# Patient Record
Sex: MALE | URBAN - METROPOLITAN AREA
[De-identification: names, ages, dates, MRNs, and addresses within clinical notes are randomized per-mention and may not be internally consistent; named-entity substitution may affect disease eponyms.]

---

## 2017-03-22 ENCOUNTER — OFFICE VISIT - RIVER FALLS (OUTPATIENT)
Dept: FAMILY MEDICINE | Facility: CLINIC | Age: 22
End: 2017-03-22

## 2017-03-22 ASSESSMENT — MIFFLIN-ST. JEOR: SCORE: 1914.33

## 2017-06-19 ENCOUNTER — OFFICE VISIT - RIVER FALLS (OUTPATIENT)
Dept: FAMILY MEDICINE | Facility: CLINIC | Age: 22
End: 2017-06-19

## 2017-06-19 ASSESSMENT — MIFFLIN-ST. JEOR: SCORE: 1917.96

## 2017-09-18 ENCOUNTER — OFFICE VISIT - RIVER FALLS (OUTPATIENT)
Dept: FAMILY MEDICINE | Facility: CLINIC | Age: 22
End: 2017-09-18

## 2017-09-18 ASSESSMENT — MIFFLIN-ST. JEOR: SCORE: 1929.75

## 2017-12-06 ENCOUNTER — OFFICE VISIT - RIVER FALLS (OUTPATIENT)
Dept: FAMILY MEDICINE | Facility: CLINIC | Age: 22
End: 2017-12-06

## 2017-12-06 ASSESSMENT — MIFFLIN-ST. JEOR: SCORE: 1966.94

## 2018-02-07 ENCOUNTER — OFFICE VISIT - RIVER FALLS (OUTPATIENT)
Dept: FAMILY MEDICINE | Facility: CLINIC | Age: 23
End: 2018-02-07

## 2018-02-07 ASSESSMENT — MIFFLIN-ST. JEOR: SCORE: 1998.7

## 2018-02-09 ENCOUNTER — OFFICE VISIT - RIVER FALLS (OUTPATIENT)
Dept: FAMILY MEDICINE | Facility: CLINIC | Age: 23
End: 2018-02-09

## 2018-02-09 ASSESSMENT — MIFFLIN-ST. JEOR: SCORE: 1998.7

## 2018-04-30 ENCOUNTER — OFFICE VISIT - RIVER FALLS (OUTPATIENT)
Dept: FAMILY MEDICINE | Facility: CLINIC | Age: 23
End: 2018-04-30

## 2018-04-30 ASSESSMENT — MIFFLIN-ST. JEOR: SCORE: 2001.42

## 2018-07-16 ENCOUNTER — OFFICE VISIT - RIVER FALLS (OUTPATIENT)
Dept: FAMILY MEDICINE | Facility: CLINIC | Age: 23
End: 2018-07-16

## 2018-07-16 ASSESSMENT — MIFFLIN-ST. JEOR: SCORE: 2034.98

## 2022-02-12 VITALS
TEMPERATURE: 97.3 F | HEIGHT: 74 IN | DIASTOLIC BLOOD PRESSURE: 82 MMHG | OXYGEN SATURATION: 98 % | HEART RATE: 65 BPM | WEIGHT: 215 LBS | BODY MASS INDEX: 27.59 KG/M2 | SYSTOLIC BLOOD PRESSURE: 134 MMHG

## 2022-02-12 VITALS
HEART RATE: 71 BPM | WEIGHT: 189.2 LBS | HEIGHT: 74 IN | TEMPERATURE: 98.7 F | SYSTOLIC BLOOD PRESSURE: 136 MMHG | DIASTOLIC BLOOD PRESSURE: 71 MMHG | BODY MASS INDEX: 24.28 KG/M2

## 2022-02-12 VITALS
BODY MASS INDEX: 24.62 KG/M2 | HEART RATE: 64 BPM | TEMPERATURE: 98.7 F | WEIGHT: 191.8 LBS | SYSTOLIC BLOOD PRESSURE: 126 MMHG | DIASTOLIC BLOOD PRESSURE: 66 MMHG | HEIGHT: 74 IN

## 2022-02-12 VITALS
DIASTOLIC BLOOD PRESSURE: 64 MMHG | BODY MASS INDEX: 26.56 KG/M2 | SYSTOLIC BLOOD PRESSURE: 126 MMHG | HEART RATE: 88 BPM | TEMPERATURE: 97.9 F | WEIGHT: 200 LBS | SYSTOLIC BLOOD PRESSURE: 124 MMHG | DIASTOLIC BLOOD PRESSURE: 74 MMHG | RESPIRATION RATE: 16 BRPM | TEMPERATURE: 98.8 F | SYSTOLIC BLOOD PRESSURE: 136 MMHG | HEIGHT: 74 IN | BODY MASS INDEX: 25.67 KG/M2 | HEART RATE: 60 BPM | DIASTOLIC BLOOD PRESSURE: 76 MMHG | HEART RATE: 66 BPM | WEIGHT: 207 LBS | BODY MASS INDEX: 26.56 KG/M2 | HEIGHT: 74 IN | TEMPERATURE: 98.5 F | WEIGHT: 207 LBS | HEIGHT: 74 IN

## 2022-02-12 VITALS
HEART RATE: 89 BPM | SYSTOLIC BLOOD PRESSURE: 125 MMHG | HEIGHT: 74 IN | DIASTOLIC BLOOD PRESSURE: 70 MMHG | BODY MASS INDEX: 24.18 KG/M2 | WEIGHT: 188.4 LBS | TEMPERATURE: 98.3 F

## 2022-02-12 VITALS
BODY MASS INDEX: 26.64 KG/M2 | HEIGHT: 74 IN | DIASTOLIC BLOOD PRESSURE: 98 MMHG | SYSTOLIC BLOOD PRESSURE: 134 MMHG | HEART RATE: 60 BPM | WEIGHT: 207.6 LBS | TEMPERATURE: 98 F

## 2022-02-15 NOTE — PROGRESS NOTES
Patient:   JOVANNA LAW            MRN: [id number]            FIN: [id number]               Age:   22 years     Sex:  Male     :  1995   Associated Diagnoses:   ADHD (attention deficit hyperactivity disorder); Depression with anxiety; Heart murmur   Author:   Malik Feliciano MD      Visit Information      Date of Service: 2017 04:39 pm  Performing Location: [facility]  Encounter#: [id number]      Primary Care Provider (PCP):  Malik Feliciano MD    NPI# [id number]      Referring Provider:  Malik Feliciano MD    NPI# [id number]   Started on ADHD medication in 2014.     Diagnosed with ADHD Davidjadonnorma Burrell. Had been getting bad grades starting in grade school thru middle school and high school for the first two years. Felt like going nowhere. Did not understand reason for not being able to focus. Had a hard time forming relationships with family and friends. Prior to medication mom states he had a hard time concentrating. He would sit in his room alone and thought he was depressed. Parents would ground him from bad grades and then would stay in his room. Grades slowly improved to A/B's in second semester on Adderall. He is more talkative around the house. He is making more friends. Making healthier choices with not drinking pop. Noticed the first day more calm on medication. Able to listen to the teacher. Has not felt high from it.      Chief Complaint   2017 4:41 PM CDT    Med refills, does not have health insurance until Oct 1.        History of Present Illness     Will be getting insurance with parents on [employer]. Last dose yesterday.  The extended release lasts until about 5pm. The immediate release helps the rest of the day. The 20mg XR did not last as long as the 25mg XR.  Mom manages the medication and keeps it locked up.  Zoloft continue to give him more motivation and energy. Sleeping better and not feeling down all the time. Has less anxiety and able to talk  with people easier. Rarely has racing thoughts. Depression started around 6th grade when moved from [place] to [place]. Depression intermittent in high school and then increased with girlfriend breakup. Exercise and lifting weights help. Seeing Dr Allen Burrell for counseling weekly    Needs sleep every night. Has more energy since no longer working late shift at [employer].  Worked at [employer] since May 2016 but quit for school.  Denies side effects from medication  Started [employer]. Working parttime at [employer]      Review of Systems   Constitutional:  sleeping good, good appetite.    Respiratory:  No shortness of breath.    Cardiovascular:  No chest pain.    ALEC-7: 0pts (September 2017). 1pt (March 2017). 9 pts (August 2015)  MDQ:  8 pts minor problem(August 2015)  PHQ-9: 0pts (September 2017). 0pts (March 2017). 2pts (April 2016)  CAGE: 0pts and quit alcohol      Health Status   Allergies:    Allergic Reactions (Selected)  No known allergies   Medications:  (Selected)   Prescriptions  Prescribed  Adderall 10 mg oral tablet: 1 tab(s) ( 10 mg ), po, daily, # 30 tab(s), 0 Refill(s), Type: Maintenance, Pharmacy: [id number], 1 tab(s) po daily  Adderall XR 25 mg oral capsule, extended release: 1 cap(s) ( 25 mg ), po, qam, # 30 cap(s), 0 Refill(s), Type: Maintenance, Pharmacy: [id number], 1 cap(s) po qam  Zoloft 50 mg oral tablet: 1 tab(s) ( 50 mg ), PO, Daily, # 95 tab(s), 3 Refill(s), Type: Maintenance, Pharmacy: [id number], 1 tab(s) po daily   Problem list:    All Problems  ADHD (attention deficit hyperactivity disorder) / SNOMED CT 01939006 / Confirmed  Depression with anxiety / SNOMED CT 558699565 / Confirmed  Resolved: History of chicken pox / SNOMED CT U672SPU1-2852--[id number]      Histories   Family History: parents and sister healthy. Thinks mom is bipolar   Procedure history:    Pilonidal  cyst and sinus with abscess (ICD-10-CM L05.0) performed by Arnie Montoya MD on 3/25/2016 at 21 Years.   Social History: [employer] and lives in [place]. Born in [place]. Parents have been here for at least 19 years.      Physical Examination   Vital Signs   9/18/2017 4:41 PM CDT Temperature Tympanic 98.7 DegF    Peripheral Pulse Rate 64 bpm    Systolic Blood Pressure 126 mmHg    Diastolic Blood Pressure 66 mmHg      General:  Alert and oriented, No acute distress.    Respiratory:  Lungs are clear to auscultation.    Cardiovascular:  Normal rate, Regular rhythm.    Musculoskeletal:  Normal gait.       Review / Management   [place] form on medication: inattentive - 0pts; hyperactive - 2pts; oppositional - 2pts (argues with adults and loses temper); conduct - 0pts; anxiety/depression - 0pts  Urine drug screen: appropriate June 2017, July 2016 and February 2016.  Reviewed [place] Drug [place] Monitoring [place] (September 2017). Cough medicine Dr Aden July 2017  DUI: May 2015  Echocardiogram March 2017: normal 55-60%      Impression and Plan   Diagnosis     ADHD (attention deficit hyperactivity disorder) (ALE95-YA F90.0).     Depression with anxiety (LNI45-GG F41.8).     Heart murmur (XHX57-FD R01.1).       ADHD: continue Adderall XR to 25mg in the morning and continue 10mg adderal immediate release in the afternoon. Follow up in 3 months. (Discussed strattera in past but not covered). Mom keeps medication locked up.  Depression with anxiety: much improved and continue zoloft (renewed March 2017)  Alcohol: Not drinking alcohol  Heart murmur: normal echocardiogram  Weight: 169 lbs in 2015. Still normal BMI  Immunizations: discussed HPV

## 2022-02-15 NOTE — PROGRESS NOTES
Patient:   JOVANNA LAW            MRN: [id number]            FIN: [id number]               Age:   22 years     Sex:  Male     :  1995   Associated Diagnoses:   ADHD (attention deficit hyperactivity disorder); Depression with anxiety; Heart murmur   Author:   Malik Feliciano MD      Visit Information      Date of Service: 2017 08:11 am  Performing Location: [facility]  Encounter#: [id number]      Primary Care Provider (PCP):  Malik Feliciano MD    NPI# [id number]      Referring Provider:  Malik Feliciano MD    NPI# [id number]   Started on ADHD medication in 2014.     Diagnosed with ADHD Allen Burrell. Had been getting bad grades starting in grade school thru middle school and high school for the first two years. Felt like going nowhere. Did not understand reason for not being able to focus. Had a hard time forming relationships with family and friends. Prior to medication mom states he had a hard time concentrating. He would sit in his room alone and thought he was depressed. Parents would ground him from bad grades and then would stay in his room. Grades slowly improved to A/B's in second semester on Adderall. He is more talkative around the house. He is making more friends. Making healthier choices with not drinking pop. Noticed the first day more calm on medication. Able to listen to the teacher. Has not felt high from it.      Chief Complaint   2017 8:21 AM CDT    ADHD check, seeing counselor  for depression      History of Present Illness     Back on insurance through work. Ran out adderall on Friday.  The extended release lasts until about 5pm. The immediate release helps the rest of the day. The 20mg XR did not last as long as the 25mg XR.  Mom manages the medication and keeps it locked up.  Zoloft continue to give him more motivation and energy. Sleeping better and not feeling down all the time. Has less anxiety and able to talk with people easier. Rarely has  racing thoughts. Depression started around 6th grade when moved from [place] to [place]. Depression intermittent in high school and then increased with girlfriend breakup. Exercise and lifting weights help. Seeing Dr Allen Burrell for counseling weekly    Needs sleep every night.  Working at [employer] since May 2016.  Denies side effects from medication         Review of Systems   Constitutional:  sleeping good, good appetite.    Respiratory:  No shortness of breath.    Cardiovascular:  No chest pain.    ALEC-7: 1pt (March 2017). 9 pts (August 2015)  MDQ:  8 pts minor problem(August 2015)  PHQ-9: 0pts (March 2017). 2pts (April 2016)  CAGE: 0pts and quit alcohol      Health Status   Allergies:    Allergic Reactions (Selected)  No known allergies   Medications:  (Selected)   Prescriptions  Prescribed  Adderall 10 mg oral tablet: 1 tab(s) ( 10 mg ), po, daily, # 30 tab(s), 0 Refill(s), Type: Maintenance, Pharmacy: [id number], 1 tab(s) po daily  Adderall XR 25 mg oral capsule, extended release: 1 cap(s) ( 25 mg ), po, qam, # 30 cap(s), 0 Refill(s), Type: Maintenance, Pharmacy: [facility], 1 cap(s) po qam  Zoloft 50 mg oral tablet: 1 tab(s) ( 50 mg ), PO, Daily, # 95 tab(s), 3 Refill(s), Type: Maintenance, Pharmacy: [id number], 1 tab(s) po daily   Problem list:    All Problems  ADHD (attention deficit hyperactivity disorder) / SNOMED CT 28516639 / Confirmed  Depression with anxiety / SNOMED CT 680729580 / Confirmed  Resolved: History of chicken pox / SNOMED CT [id number]--[id number]      Histories   Family History: parents and sister healthy. Thinks mom is bipolar   Procedure history:    Pilonidal cyst and sinus with abscess (ICD-10-CM L05.0) performed by Arnie Montoya MD on 3/25/2016 at 21 Years.   Social History: [place] [employer] and lives in [place]. Born in [place]. Parents have been here for at least 19 years. Plans on going to  [employer] or [employer]      Physical Examination   Vital Signs   6/19/2017 8:21 AM CDT Temperature Tympanic 98.7 DegF    Peripheral Pulse Rate 71 bpm    Systolic Blood Pressure 136 mmHg    Diastolic Blood Pressure 71 mmHg      General:  Alert and oriented, No acute distress.    Respiratory:  Lungs are clear to auscultation.    Cardiovascular:  Normal rate, Regular rhythm.    Musculoskeletal:  Normal gait.       Review / Management   [place] form on medication: inattentive - 0pts; hyperactive - 2pts; oppositional - 2pts (argues with adults and loses temper); conduct - 0pts; anxiety/depression - 0pts  Urine drug screen: appropriate June 2017, July 2016 and February 2016.  [place] (March 2017)  DUI: May 2015  Echocardiogram 2017: normal 55-60%         Impression and Plan   Diagnosis     ADHD (attention deficit hyperactivity disorder) (IQV28-IC F90.0).     Depression with anxiety (BOG85-AD F41.8).     Heart murmur (GDD81-QM R01.1).       ADHD: continue Adderall XR to 25mg in the morning and continue 10mg adderal immediate release in the afternoon. Follow up in 3 months. (Discussed strattera in past but not covered). Mom keeps medication locked up. Last dose on Friday.  Depression with anxiety: much improved and continue zoloft (renewed March 2017)  Alcohol: Not drinking alcohol  Heart murmur: normal echocardiogram  Weight: 169 lbs in 2015. Still normal BMI  Immunizations: discussed HPV

## 2022-02-15 NOTE — PROGRESS NOTES
Patient:   JOVANNA LAW            MRN: [id number]            FIN: [id number]               Age:   22 years     Sex:  Male     :  1995   Associated Diagnoses:   Adult acne   Author:   Teddy Tillman PA-C      Chief Complaint   2018 3:41 PM CST     Pt here to discuss rash on forehead and headache that started this morning.        History of Present Illness   Chief complaint and symptoms noted above and confirmed with patient   he has been on 50 mg zoloft for about 2 years, dose was reduced to 25 mg two days ago, he wonders if   this could cause this rash  he just started working at [employer] and is doing cleaning for the past week,  environment is hot and humid      Review of Systems   Constitutional:  Negative.    Ear/Nose/Mouth/Throat:  Negative.    Respiratory:  Negative.       Health Status   Allergies:    Allergic Reactions (Selected)  No known allergies   Medications:  (Selected)   Prescriptions  Prescribed  Adderall 10 mg oral tablet: 1 tab(s) ( 10 mg ), po, daily, # 30 tab(s), 0 Refill(s), Type: Maintenance, Pharmacy: [id number], 1 tab(s) po daily  Adderall XR 25 mg oral capsule, extended release: 1 cap(s) ( 25 mg ), po, qam, # 30 cap(s), 0 Refill(s), Type: Maintenance, Pharmacy: [facility] [id number], 1 cap(s) po qam  Zoloft 25 mg oral tablet: 1 tab(s) ( 25 mg ), po, daily, # 95 tab(s), 3 Refill(s), Type: Maintenance, Pharmacy: [facility] [id number], 1 tab(s) po daily   Problem list:    All Problems (Selected)  Depression with anxiety / SNOMED CT 227568324 / Confirmed  ADHD (attention deficit hyperactivity disorder) / SNOMED CT 73959281 / Confirmed      Histories   Past Medical History:    Active  ADHD (attention deficit hyperactivity disorder) (39102644)  Depression with anxiety (120917141)  Resolved  History of chicken pox ([id number]--[id number]):  Resolved.   Family History:    No family history items have been selected or recorded.   Procedure  history:    Pilonidal cyst and sinus with abscess (L05.0) on 3/25/2016 at 21 Years.      Physical Examination   Vital Signs   2/9/2018 3:41 PM CST Temperature Tympanic 98.5 DegF    Peripheral Pulse Rate 88 bpm    Pulse Site Radial artery    Respiratory Rate 16 br/min    Systolic Blood Pressure 126 mmHg    Diastolic Blood Pressure 74 mmHg    Mean Arterial Pressure 91 mmHg    BP Site Right arm      Measurements from flowsheet : Measurements   2/9/2018 3:41 PM CST Height Measured - Standard 74 in    Weight Measured - Standard 207 lb    BSA 2.21 m2    Body Mass Index 26.57 kg/m2  HI      General:  No acute distress.    Integumentary:  scattered acne across forehead, some closed comedomes, no drainage  no lesions on cheeks or chin.       Impression and Plan   Diagnosis     Adult acne (TPW32-EY L70.9).     Summary:  will treat with Retin-A qhs, gentle face cleaning, follow up if not improving.    Orders     Orders   Pharmacy:  Retin-A 0.05% topical cream (Prescribe): 1 sherry, top, hs, # 45 gm, 1 Refill(s), Type: Maintenance, Pharmacy: [id number], 1 sherry top hs.     Orders   Charges (Evaluation and Management):  88180 office outpatient visit 15 minutes (Charge) (Order): Quantity: 1, Adult acne.

## 2022-02-15 NOTE — PROGRESS NOTES
Chief Complaint    Pt c/o sore throat, cough and SOB and left ear pain for 1 week.  History of Present Illness      Developed plugging in the left ear. The ear pressure causes some chest pain and shortness of breath. Symptoms began a week ago. Felt some chest pain while taking a shower for a few minutes and resolved spontaneously. Shortness of breath  and chest pain improve with taking deep breaths. Has some coughing. Stopped drinking coffee.       No early family history of cardiac disease. No history of blood clots  Review of Systems      No fevers       No vomiting       Working at [employer]  Physical Exam   Vitals & Measurements    T: 97.3   F (Tympanic)  HR: 65(Peripheral)  BP: 134/82  SpO2: 98%       General: No acute distress       Ears: Right membrane is normal.  Left canal is occluded by cerumen       Neck: Without lymphadenopathy       Lungs: Clear       Heart: Regular rate and rhythm       Extremities: Without edema       Abdomen: Soft and nondistended       Irrigation performed by the medical assistant in the left ear with removal of significant cerumen.  Hearing improved.       Echocardiogram March 2017: normal with EF 55-60%       D-dimer: negative       Troponin: negative       Patient declines further testing  Assessment/Plan   Decreased hearing: Improved with removal of impacted cerumen    Chest pain: Most likely related to anxiety.  Lab testing negative and patient declines any further testing.  Will follow-up if symptoms do not improve.    ADHD: Reviewed [place] drug prescription monitoring program.  Refilled Adderall for 2 months.  Patient Information     Name:JOVANNA LAW      Address:      [address] [place] [address]      Sex:Male      YOB: 1995      [phone]      Emergency Contact:BENJIE LAW     MRN:[id number]     FIN:[id number]     Location:[facility] [place]     Date of Service:07/16/2018      Primary Care Physician:       Malik Feliciano MD, [phone]       Attending Physician:       Malik Feliciano MD, [phone]  Problem List/Past Medical History    Ongoing     ADHD (attention deficit hyperactivity disorder)     Depression with anxiety    Historical     History of chicken pox  Procedure/Surgical History     Pilonidal cyst and sinus with abscess (03/25/2016)  Medications        Adderall XR 20 mg oral capsule, extended release: 40 mg, 2 cap(s), po, qam, 60 cap(s), 0 Refill(s).                Allergies    No known allergies

## 2022-02-15 NOTE — PROGRESS NOTES
Patient:   TAMMY LAW            MRN: [id number]            FIN: [id number]               Age:   22 years     Sex:  Male     :  1995   Associated Diagnoses:   ADHD (attention deficit hyperactivity disorder); Depression with anxiety; Heart murmur   Author:   Malik Feliciano MD      Visit Information      Date of Service: 2017 05:43 pm  Performing Location: [facility]  Encounter#: [id number]      Primary Care Provider (PCP):  Malik Feliciano MD    NPI# [id number]      Referring Provider:  No referring provider recorded for selected visit.   Started on ADHD medication in 2014.     Diagnosed with ADHD Davidjadonnorma Burrell. Had been getting bad grades starting in grade school thru middle school and high school for the first two years. Felt like going nowhere. Did not understand reason for not being able to focus. Had a hard time forming relationships with family and friends. Prior to medication mom states he had a hard time concentrating. He would sit in his room alone and thought he was depressed. Parents would ground him from bad grades and then would stay in his room. Grades slowly improved to A/B's in second semester on Adderall. He is more talkative around the house. He is making more friends. Making healthier choices with not drinking pop. Noticed the first day more calm on medication. Able to listen to the teacher. Has not felt high from it.      Chief Complaint   3/22/2017 5:44 PM CDT    Recheck ADHD        History of Present Illness     Back on insurance through work. Ran out adderall on Friday.  The extended release lasts until about 5pm. The immediate release helps the rest of the day. The 20mg XR did not last as long as the 25mg XR.  Mom manages the medication and keeps it locked up.  Zoloft continue to give him more motivation and energy. Sleeping better and not feeling down all the time. Has less anxiety and able to talk with people easier. Rarely has racing thoughts.  Depression started around 6th grade when moved from [place] to [place]. Depression intermittent in high school and then increased with girlfriend breakup. Exercise and lifting weights help.    Needs sleep every night.  Working at [employer] since May 2016.  Denies side effects from medication         Review of Systems   Constitutional:  sleeping good, good appetite.    Respiratory:  No shortness of breath.    Cardiovascular:  No chest pain.    ALEC-7: 1pt (March 2017). 9 pts (August 2015)  MDQ:  8 pts minor problem(August 2015)  PHQ-9: 0pts (March 2017). 2pts (April 2016)  CAGE: 0pts and quit alcohol      Health Status   Allergies:    Allergic Reactions (Selected)  No known allergies   Medications:  (Selected)   Prescriptions  Prescribed  Adderall 10 mg oral tablet: 1 tab(s) ( 10 mg ), po, daily, # 30 tab(s), 0 Refill(s), Type: Maintenance, Pharmacy: [facility] [id number], 1 tab(s) po daily  Adderall XR 25 mg oral capsule, extended release: 1 cap(s) ( 25 mg ), po, qam, # 30 cap(s), 0 Refill(s), Type: Maintenance, Pharmacy: [facility], 1 cap(s) po qam  Zoloft 50 mg oral tablet: 1 tab(s) ( 50 mg ), PO, Daily, # 95 tab(s), 3 Refill(s), Type: Maintenance, Pharmacy: [facility], 1 tab(s) po daily   Problem list:    All Problems  ADHD (attention deficit hyperactivity disorder) / SNOMED CT 41975193 / Confirmed  Depression with anxiety / SNOMED CT 696482905 / Confirmed  Resolved: History of chicken pox / SNOMED CT [id number]--[id number]      Histories   Family History: parents and sister healthy. Thinks mom is bipolar   Procedure history:    Pilonidal cyst and sinus with abscess (ICD-10-CM L05.0) performed by Arnie Montoya MD on 3/25/2016 at 21 Years.   Social History: [employer] [employer] and lives in [place]. Born in [place]. Parents have been here for at least 19 years. Plans on going to [employer] or [employer]      Physical Examination    Vital Signs   3/22/2017 5:44 PM CDT Temperature Tympanic 98.3 DegF    Peripheral Pulse Rate 89 bpm    Systolic Blood Pressure 125 mmHg    Diastolic Blood Pressure 70 mmHg    Mean Arterial Pressure 88 mmHg      General:  Alert and oriented, No acute distress.    Respiratory:  Lungs are clear to auscultation.    Cardiovascular:  Normal rate, Regular rhythm, systolic murmur II/VI.    Musculoskeletal:  Normal gait.       Review / Management   [place] form on medication: inattentive - 0pts; hyperactive - 2pts; oppositional - 2pts (argues with adults and loses temper); conduct - 0pts; anxiety/depression - 0pts  Urine drug screen: appropriate July 2016 and February 2016.  [place] (March 2017)      Impression and Plan   Diagnosis     ADHD (attention deficit hyperactivity disorder) (ACI77-UR F90.0).     Depression with anxiety (TRG56-FV F41.8).     Heart murmur (ADD99-EM R01.1).       ADHD: continue Adderall XR to 25mg in the morning and continue 10mg adderal immediate release in the afternoon. Follow up in 3 months. (Discussed strattera in past but not covered). Mom keeps medication locked up  Depression with anxiety: much improved and continue zoloft (renewed March 2017)  Alcohol: Plan to start [employer] this fall (once insurance starts) and follow up with Psychologist (Reynaldo Burrell) in March  Heart murmur: arrange echocardiogram. Asymptomatic  Has gained 20+ lbs since last summer. Still normal BMI

## 2022-02-15 NOTE — PROGRESS NOTES
Patient:   JOVANNA LAW            MRN: [id number]            FIN: [id number]               Age:   23 years     Sex:  Male     :  1995   Associated Diagnoses:   ADHD (attention deficit hyperactivity disorder); Depression with anxiety; Heart murmur   Author:   Malik Feliciano MD      Visit Information      Date of Service: 2018 07:55 am  Performing Location: [facility]  Encounter#: [id number]      Primary Care Provider (PCP):  Malik Feliciano MD    NPI# [id number]      Referring Provider:  Malik Feliciano MD    NPI# [id number]          Chief Complaint   2018 7:59 AM CDT    ADHD refill, wanting to switch to an increased dose of  XR once daily.  Stopped taking Zoloft.     ADHD: Diagnosed  with ADHD Allen Burrell. Had been getting bad grades starting in grade school thru middle school and high school for the first two years. Felt like going nowhere. Did not understand reason for not being able to focus. Had a hard time forming relationships with family and friends. Prior to medication mom states he had a hard time concentrating. He would sit in his room alone and thought he was depressed. Parents would ground him from bad grades and then would stay in his room. Grades slowly improved to A/B's in second semester on Adderall. He is more talkative around the house. He is making more friends. Making healthier choices with not drinking pop. Noticed the first day more calm on medication. Able to listen to the teacher. Has not felt high from it.  Started on ADHD medication in 2014.       History of Present Illness   Last Adderall yesterday.    Mom manages the medication and keeps it locked up.  Zoloft had given him more motivation and energy. Depression and anxiety controlled. Sleeping better and not feeling down all the time. Has less anxiety and able to talk with people easier. Rarely has racing thoughts. Depression started around 6th grade when moved from [place] to [facility]  [place]. Depression intermittent in high school and then increased with girlfriend breakup. Exercise and lifting weights help. Seeing Dr Allen Burrell for counseling intermittently.  Decided to stop the Zoloft, made himfeel high and did not like that.  Started Zoloft February 2016.  Stopped 2018 and doing well    Needs sleep every night. Has more energy since no longer working late shift at [employer].  Worked at [employer] since May 2016 but quit for school.  Denies side effects from medication  Starts [employer] Fall 2019. Working full time at [employer] and attending [employer]      Review of Systems   Constitutional:  sleeping good, good appetite.    Respiratory:  No shortness of breath.    Cardiovascular:  No chest pain.    ALEC-7: 0pts (September 2017). 1pt (March 2017). 9 pts (August 2015)  MDQ:  0pts (December 2017). 8 pts minor problem(August 2015)  PHQ-9: 0pts (September 2017). 0pts (March 2017). 2pts (April 2016)  CAGE: 0pts and quit alcohol      Health Status   Allergies:    Allergic Reactions (Selected)  No known allergies   Medications:  (Selected)   Prescriptions  Prescribed  Adderall 10 mg oral tablet: 1 tab(s) ( 10 mg ), po, daily, # 30 tab(s), 0 Refill(s), Type: Maintenance, Pharmacy: [id number], 1 tab(s) po daily  Adderall XR 25 mg oral capsule, extended release: 1 cap(s) ( 25 mg ), po, qam, # 30 cap(s), 0 Refill(s), Type: Maintenance, Pharmacy: [facility] [id number], 1 cap(s) po qam  Retin-A 0.05% topical cream: 1 sherry, top, hs, # 45 gm, 1 Refill(s), Type: Maintenance, Pharmacy: [facility] [id number], 1 sherry top hs   Problem list:    All Problems  ADHD (attention deficit hyperactivity disorder) / SNOMED CT 90161991 / Confirmed  Depression with anxiety / SNOMED CT 911626438 / Confirmed  Resolved: History of chicken pox / SNOMED CT [id number]--[id number]      Histories   Family History: parents and sister healthy. Thinks mom is bipolar    Procedure history:    Pilonidal cyst and sinus with abscess (L05.0) on 3/25/2016 at 21 Years.   Social History: [employer] and lives in [place]. Born in [place]. Parents have been here since the 1990's. No current relationship      Physical Examination   Vital Signs   4/30/2018 7:59 AM CDT Temperature Tympanic 98.0 DegF    Peripheral Pulse Rate 60 bpm    Pulse Site Radial artery    HR Method Manual    Systolic Blood Pressure 134 mmHg  HI    Diastolic Blood Pressure 98 mmHg  HI    Mean Arterial Pressure 110 mmHg    BP Site Right arm    BP Method Manual      Measurements from flowsheet : Measurements   4/30/2018 7:59 AM CDT Height Measured - Standard 74 in    Weight Measured - Standard 207.6 lb    BSA 2.22 m2    Body Mass Index 26.65 kg/m2  HI      General:  Alert and oriented, No acute distress.    Respiratory:  Lungs are clear to auscultation.    Cardiovascular:  Normal rate, Regular rhythm.    Musculoskeletal:  Normal gait.       Review / Management   [place] form on medication: inattentive - 0pts; hyperactive - 2pts; oppositional - 2pts (argues with adults and loses temper); conduct - 0pts; anxiety/depression - 0pts  [place] Drug P[place] Monitoring Program (April 2018). Cough medicine Dr Aden July 2017  DUI: May 2015  Echocardiogram March 2017: normal 55-60%  CSA:  12/6/17    Urine drug screen: appropriate May 2018, June 2017, July 2016 and February 2016.         Impression and Plan   Diagnosis     ADHD (attention deficit hyperactivity disorder) (SIW67-WY F90.0).     Depression with anxiety (GWK73-XO F41.8).     Heart murmur (FWX79-EH R01.1).       ADHD: Change Adderall to 40mg XR. Follow up in 3 months. Mom keeps medication locked up.  Depression with anxiety: Much improved.  Stopped Zoloft 2018.   Alcohol: Not drinking alcohol  Heart murmur: normal echocardiogram  Weight: 169 lbs in 2015.    Joy RANDLE RN, acted solely as a scribe for, and in the presence of Dr. Brant Feliciano who performed  the services.

## 2022-02-15 NOTE — PROGRESS NOTES
Patient:   JOVANNA LAW            MRN: [id number]            FIN: [id number]               Age:   22 years     Sex:  Male     :  1995   Associated Diagnoses:   ADHD (attention deficit hyperactivity disorder); Depression with anxiety; Heart murmur   Author:   Malik Feliciano MD      Visit Information      Date of Service: 2018 01:29 pm  Performing Location: [facility]  Encounter#: [id number]      Primary Care Provider (PCP):  Malik Feliciano MD    NPI# [id number]      Referring Provider:  Malik Feliciano MD    NPI# [id number]   Started on ADHD medication in 2014.     Diagnosed with ADHD Davidjadonnorma Burrell. Had been getting bad grades starting in grade school thru middle school and high school for the first two years. Felt like going nowhere. Did not understand reason for not being able to focus. Had a hard time forming relationships with family and friends. Prior to medication mom states he had a hard time concentrating. He would sit in his room alone and thought he was depressed. Parents would ground him from bad grades and then would stay in his room. Grades slowly improved to A/B's in second semester on Adderall. He is more talkative around the house. He is making more friends. Making healthier choices with not drinking pop. Noticed the first day more calm on medication. Able to listen to the teacher. Has not felt high from it.      Chief Complaint   2018 1:30 PM CST     Med refills      History of Present Illness     Last Adderall yesterday.  The extended release lasts until about 5pm. The immediate release helps the rest of the day. The 20mg XR did not last as long as the 25mg XR.  Mom manages the medication and keeps it locked up.  Zoloft continue to give him more motivation and energy. Depression and anxiety controlled. Sleeping better and not feeling down all the time. Has less anxiety and able to talk with people easier. Rarely has racing thoughts. Depression started  around 6th grade when moved from [place] to [place]. Depression intermittent in high school and then increased with girlfriend breakup. Exercise and lifting weights help. Seeing Dr Allen Burrell for counseling intermittently  Started Zoloft February 2016    Needs sleep every night. Has more energy since no longer working late shift at [employer].  Worked at [employer] since May 2016 but quit for school.  Denies side effects from medication  Start [employer] Fall 2018. Working parttime at [employer]      Review of Systems   Constitutional:  sleeping good, good appetite.    Respiratory:  No shortness of breath.    Cardiovascular:  No chest pain.    ALEC-7: 0pts (September 2017). 1pt (March 2017). 9 pts (August 2015)  MDQ:  0pts (December 2017). 8 pts minor problem(August 2015)  PHQ-9: 0pts (September 2017). 0pts (March 2017). 2pts (April 2016)  CAGE: 0pts and quit alcohol      Health Status   Allergies:    Allergic Reactions (Selected)  No known allergies   Medications:  (Selected)   Prescriptions  Prescribed  Adderall 10 mg oral tablet: 1 tab(s) ( 10 mg ), po, daily, # 30 tab(s), 0 Refill(s), Type: Maintenance, Pharmacy: [facility] [id number], 1 tab(s) po daily  Adderall XR 25 mg oral capsule, extended release: 1 cap(s) ( 25 mg ), po, qam, # 30 cap(s), 0 Refill(s), Type: Maintenance, Pharmacy: [facility] [id number], 1 cap(s) po qam  Zoloft 50 mg oral tablet: 1 tab(s) ( 50 mg ), PO, Daily, # 95 tab(s), 3 Refill(s), Type: Maintenance, Pharmacy: [facility] [id number], 1 tab(s) po daily   Problem list:    All Problems  ADHD (attention deficit hyperactivity disorder) / SNOMED CT 99837520 / Confirmed  Depression with anxiety / SNOMED CT 591302345 / Confirmed  Resolved: History of chicken pox / SNOMED CT [id number]--[id number]      Histories   Family History: parents and sister healthy. Thinks mom is bipolar   Social History: [employer] and lives in  [place]. Born in [place]. Parents have been here since the 1990's. No current relationship      Physical Examination   Vital Signs   2/7/2018 1:30 PM CST Temperature Tympanic 97.9 DegF    Peripheral Pulse Rate 60 bpm    Pulse Site Radial artery    HR Method Manual    Systolic Blood Pressure 136 mmHg  HI    Diastolic Blood Pressure 64 mmHg    Mean Arterial Pressure 88 mmHg    BP Site Right arm    BP Method Manual      Measurements from flowsheet : Measurements   2/7/2018 1:30 PM CST Height Measured - Standard 74 in    Weight Measured - Standard 207 lb    BSA 2.21 m2    Body Mass Index 26.57 kg/m2  HI      General:  Alert and oriented, No acute distress.    Respiratory:  Lungs are clear to auscultation.    Cardiovascular:  Normal rate, Regular rhythm.    Musculoskeletal:  Normal gait.       Review / Management   [place] form on medication: inattentive - 0pts; hyperactive - 2pts; oppositional - 2pts (argues with adults and loses temper); conduct - 0pts; anxiety/depression - 0pts  Urine drug screen: appropriate June 2017, July 2016 and February 2016.  [place] Drug [place] (February 2018). Cough medicine Dr Aden July 2017  DUI: May 2015  Echocardiogram March 2017: normal 55-60%      Impression and Plan   Diagnosis     ADHD (attention deficit hyperactivity disorder) (RHL69-OS F90.0).     Depression with anxiety (OQZ96-GZ F41.8).     Heart murmur (LCG17-DB R01.1).       ADHD: continue Adderall XR to 25mg in the morning and continue 10mg adderal immediate release in the afternoon. Follow up in 3 months. (Discussed strattera in past but not covered). Mom keeps medication locked up.  Depression with anxiety: much improved and decrease zoloft to 25mg daily (renewed February 2018)  Alcohol: Not drinking alcohol  Heart murmur: normal echocardiogram  Weight: 169 lbs in 2015. Still normal BMI    Addendum 4/12: Tapered off Zoloft

## 2022-02-15 NOTE — PROGRESS NOTES
Patient:   JOVANNA LAW            MRN: [id number]            FIN: [id number]               Age:   22 years     Sex:  Male     :  1995   Associated Diagnoses:   ADHD (attention deficit hyperactivity disorder); Depression with anxiety; Heart murmur   Author:   Malik Feliciano MD      Visit Information      Date of Service: 2017 12:00 pm  Performing Location: [facility]  Encounter#: [id number]      Primary Care Provider (PCP):  Malik Feliciano MD    NPI# [id number]      Referring Provider:  Malik Feliciano MD    NPI# [id number]   Started on ADHD medication in 2014.     Diagnosed with ADHD Davidjadonnorma Burrell. Had been getting bad grades starting in grade school thru middle school and high school for the first two years. Felt like going nowhere. Did not understand reason for not being able to focus. Had a hard time forming relationships with family and friends. Prior to medication mom states he had a hard time concentrating. He would sit in his room alone and thought he was depressed. Parents would ground him from bad grades and then would stay in his room. Grades slowly improved to A/B's in second semester on Adderall. He is more talkative around the house. He is making more friends. Making healthier choices with not drinking pop. Noticed the first day more calm on medication. Able to listen to the teacher. Has not felt high from it.      Chief Complaint   2017 12:02 PM CST   Med refills        History of Present Illness     Last Adderall yesterday.  The extended release lasts until about 5pm. The immediate release helps the rest of the day. The 20mg XR did not last as long as the 25mg XR.  Mom manages the medication and keeps it locked up.  Zoloft continue to give him more motivation and energy. Depression and anxiety controlled. Sleeping better and not feeling down all the time. Has less anxiety and able to talk with people easier. Rarely has racing thoughts. Depression  started around 6th grade when moved from [place] to [place]. Depression intermittent in high school and then increased with girlfriend breakup. Exercise and lifting weights help. Seeing Dr Allen Burrell for counseling intermittently    Needs sleep every night. Has more energy since no longer working late shift at [employer].  Worked at [employer] since May 2016 but quit for school.  Denies side effects from medication  Started [employer] Fall 2017. Working parttime at [employer]      Review of Systems   Constitutional:  sleeping good, good appetite.    Respiratory:  No shortness of breath.    Cardiovascular:  No chest pain.    ALEC-7: 0pts (September 2017). 1pt (March 2017). 9 pts (August 2015)  MDQ:  0pts (December 2017). 8 pts minor problem(August 2015)  PHQ-9: 0pts (September 2017). 0pts (March 2017). 2pts (April 2016)  CAGE: 0pts and quit alcohol      Health Status   Allergies:    Allergic Reactions (Selected)  No known allergies   Medications:  (Selected)   Prescriptions  Prescribed  Adderall 10 mg oral tablet: 1 tab(s) ( 10 mg ), po, daily, # 30 tab(s), 0 Refill(s), Type: Maintenance, Pharmacy: [facility] [id number], 1 tab(s) po daily  Adderall XR 25 mg oral capsule, extended release: 1 cap(s) ( 25 mg ), po, qam, # 30 cap(s), 0 Refill(s), Type: Maintenance, Pharmacy: [facility] [id number], 1 cap(s) po qam  Zoloft 50 mg oral tablet: 1 tab(s) ( 50 mg ), PO, Daily, # 95 tab(s), 3 Refill(s), Type: Maintenance, Pharmacy: [facility] [id number], 1 tab(s) po daily   Problem list:    All Problems  ADHD (attention deficit hyperactivity disorder) / SNOMED CT 67168935 / Confirmed  Depression with anxiety / SNOMED CT 762231909 / Confirmed  Resolved: History of chicken pox / SNOMED CT [id number]--[id number]      Histories   Family History: parents and sister healthy. Thinks mom is bipolar   Procedure history:    Pilonidal cyst and sinus with  abscess (ICD-10-CM L05.0) performed by Arnie Montoya MD on 3/25/2016 at 21 Years.   Social History: [employer] and lives in [place]. Born in [place]. Parents have been here since the 1990's. No current relationship      Physical Examination   Vital Signs   12/6/2017 12:02 PM CST Temperature Tympanic 98.8 DegF    Peripheral Pulse Rate 66 bpm    Pulse Site Radial artery    HR Method Manual    Systolic Blood Pressure 124 mmHg    Diastolic Blood Pressure 76 mmHg    Mean Arterial Pressure 92 mmHg    BP Site Right arm    BP Method Manual      Measurements from flowsheet : Measurements   12/6/2017 12:02 PM CST Height Measured - Standard 74 in    Weight Measured - Standard 200.0 lb    BSA 2.17 m2    Body Mass Index 25.68 kg/m2      General:  Alert and oriented, No acute distress.    Respiratory:  Lungs are clear to auscultation.    Cardiovascular:  Normal rate, Regular rhythm.    Musculoskeletal:  Normal gait.       Review / Management   [place] form on medication: inattentive - 0pts; hyperactive - 2pts; oppositional - 2pts (argues with adults and loses temper); conduct - 0pts; anxiety/depression - 0pts  Urine drug screen: appropriate June 2017, July 2016 and February 2016.  [place] (December 2017). Cough medicine Dr Aden July 2017  DUI: May 2015  Echocardiogram March 2017: normal 55-60%      Impression and Plan   Diagnosis     ADHD (attention deficit hyperactivity disorder) (IYS31-JB F90.0).     Depression with anxiety (LZU55-MK F41.8).     Heart murmur (AXM89-GR R01.1).       ADHD: continue Adderall XR to 25mg in the morning and continue 10mg adderal immediate release in the afternoon. Follow up in 3 months. (Discussed strattera in past but not covered). Mom keeps medication locked up.  Depression with anxiety: much improved and continue zoloft (renewed March 2017)  Alcohol: Not drinking alcohol  Heart murmur: normal echocardiogram  Weight: 169 lbs in 2015. Still normal  BMI